# Patient Record
Sex: MALE | Race: AMERICAN INDIAN OR ALASKA NATIVE
[De-identification: names, ages, dates, MRNs, and addresses within clinical notes are randomized per-mention and may not be internally consistent; named-entity substitution may affect disease eponyms.]

---

## 2022-04-21 ENCOUNTER — HOSPITAL ENCOUNTER (EMERGENCY)
Dept: HOSPITAL 5 - ED | Age: 67
LOS: 1 days | Discharge: NURSING, REHAB OR LONG-TERM CARE | End: 2022-04-22
Payer: MEDICARE
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
## 2022-04-21 DIAGNOSIS — I10: ICD-10-CM

## 2022-04-21 DIAGNOSIS — S32.009A: Primary | ICD-10-CM
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
## 2022-04-21 DIAGNOSIS — Z87.891: ICD-10-CM
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
## 2022-04-21 PROCEDURE — 96372 THER/PROPH/DIAG INJ SC/IM: CPT

## 2022-04-21 PROCEDURE — 73521 X-RAY EXAM HIPS BI 2 VIEWS: CPT

## 2022-04-21 PROCEDURE — 72100 X-RAY EXAM L-S SPINE 2/3 VWS: CPT

## 2022-04-21 PROCEDURE — 99283 EMERGENCY DEPT VISIT LOW MDM: CPT

## 2022-04-21 PROCEDURE — 73562 X-RAY EXAM OF KNEE 3: CPT

## 2022-04-21 NOTE — XRAY REPORT
Lumbar spine 2 views



INDICATION: Low back pain following injury



IMPRESSION: there is loss of vertebral body height involving the L1 vertebral body concerning for mil
d compression fracture. Mild multilevel discogenic and facet arthropathy causing mild bilateral neura
l foraminal narrowing at L5-S1.



Signer Name: Pako Sher MD 

Signed: 4/21/2022 8:30 PM

Workstation Name: [email or web address]

## 2022-04-21 NOTE — XRAY REPORT
Bilateral hips 3 views



INDICATION: Lateral hip pain following injury



IMPRESSION: No fracture or subluxation is identified although the patient is moderately osteopenic. T
here is mild osteopenia. A large urinary bladder calculus projects within the right lower aspect of t
he urinary bladder.



Signer Name: Pako Sher MD 

Signed: 4/21/2022 8:29 PM

Workstation Name: [email or web address]

## 2022-04-21 NOTE — EMERGENCY DEPARTMENT REPORT
ED Fall HPI





- General


Chief Complaint: Fall


Stated Complaint: BACK PAIN/FALL X 2 DAYS


Time Seen by Provider: 04/21/22 19:21


Source: patient, EMS


Mode of arrival: Stretcher





- History of Present Illness


Initial Comments: 





Patient is 67-year-old male with history of COPD, hypertension, colon cancer.  

Patient brought to the emergency room via EMS from a local nursing home for 

evaluation after a fall that happened 3 days ago.  Patient tripped and fell 

backward.  Patient is complaining of lower back pain bilateral hip pain and 

bilateral knee pain.  Patient and nursing home denied any head injury.  No loss 

of consciousness.


MD Complaint: fall


-: days(s) (3)


Fall From: standing


When Fall Occurred: # days PTA (3)


Place Fall Occurred: nursing home/SNF


Loss of Consciousness: none


Prolonged Down Time?: no


Symptoms Prior to Fall: none


Location: back, pelvis


Location - Extremities: Left: Knee, Right: Knee


Quality: sharp


Context: tripped/slipped


Associated Symptoms: denies.  denies: headache, neck pain, numbness, weakness, 

chest paint, shortness of breath, abdominal pain, hematuria, unable to walk, 

lightheaded, vertigo, confusion





- Related Data


                                    Allergies











Allergy/AdvReac Type Severity Reaction Status Date / Time


 


No Known Allergies Allergy   Verified 04/21/22 18:58














ED Review of Systems


ROS: 


Stated complaint: BACK PAIN/FALL X 2 DAYS


Other details as noted in HPI





Comment: All other systems reviewed and negative


Constitutional: denies: chills, fever


Respiratory: denies: cough, shortness of breath, SOB with exertion, SOB at rest


Cardiovascular: denies: chest pain, palpitations


Gastrointestinal: denies: abdominal pain, nausea, vomiting, diarrhea, 

constipation, hematemesis, hematochezia


Musculoskeletal: back pain, arthralgia


Neurological: denies: headache, weakness





ED Past Medical Hx





- Past Medical History


Hx Hypertension: Yes


Hx COPD: Yes


Additional medical history: ETOH abuse.  hypomagnesemia





- Social History


Smoking Status: Former Smoker





ED Physical Exam





- General


Limitations: No Limitations


General appearance: alert, in no apparent distress





- Head


Head exam: Present: atraumatic, normocephalic, normal inspection





- Eye


Eye exam: Present: normal appearance





- ENT


ENT exam: Present: normal exam, normal orophraynx, mucous membranes moist





- Neck


Neck exam: Present: normal inspection, full ROM.  Absent: tenderness, 

meningismus





- Respiratory


Respiratory exam: Present: normal lung sounds bilaterally





- Cardiovascular


Cardiovascular Exam: Present: regular rate, normal rhythm, normal heart sounds





- GI/Abdominal


GI/Abdominal exam: Present: soft, normal bowel sounds.  Absent: distended, 

tenderness, guarding, rebound, rigid





- Back Exam


Back exam: Present: paraspinal tenderness





- Neurological Exam


Neurological exam: Present: alert, oriented X3





- Psychiatric


Psychiatric exam: Present: normal mood





ED Course


                                   Vital Signs











  04/21/22 04/21/22





  18:39 18:49


 


Temperature 98.3 F 97.0 F L


 


Pulse Rate 64 91 H


 


Respiratory 16 20





Rate  


 


Blood Pressure  143/76


 


Blood Pressure 126/76 





[Right]  


 


O2 Sat by Pulse 100 100





Oximetry  














ED Medical Decision Making





- Radiology Data


Radiology results: report reviewed





- Medical Decision Making





Patient is 67-year-old male with history of COPD, hypertension, colon cancer.  

Patient brought to the emergency room via EMS from a local nursing home for 

evaluation after a fall that happened 3 days ago.  Patient tripped and fell 

backward.  Patient is complaining of lower back pain bilateral hip pain and 

bilateral knee pain.  Patient and nursing home denied any head injury.  No loss 

of consciousness.





X-ray of bilateral hip and bilateral knee showed no acute fracture or 

dislocation.  X-ray of the lumbosacral spine showed L1 mild compression 

fracture.  Patient given prescription for pain medicine and advised to follow-up

 with orthopedics in the next 2 to 3 days and to return to the ER if develop any

 new symptoms.





Critical care attestation.: 


If time is entered above; I have spent that time in minutes in the direct care 

of this critically ill patient, excluding procedure time.








ED Disposition


Clinical Impression: 


 Fall, Vertebral fracture, closed





Disposition: [facility]


Is pt being admited?: No


Condition: Stable


Instructions:  Fall Prevention in the Home, Adult, Easy-to-Read, Lumbar Spine 

Fracture


Referrals: 


HUMA LEDEZMA MD [Primary Care Provider] - 3-5 Days


PETE DICKERSON MD [Staff Physician] - 3-5 Days

## 2022-04-21 NOTE — XRAY REPORT
Knees bilateral 3 views



INDICATION: Bilateral knee pain



IMPRESSION: No fracture or subluxation identified. Mild degenerative changes of both knees present.



Signer Name: Pako Sher MD 

Signed: 4/21/2022 8:29 PM

Workstation Name: [email or web address]

## 2022-04-22 VITALS — DIASTOLIC BLOOD PRESSURE: 67 MMHG | SYSTOLIC BLOOD PRESSURE: 137 MMHG

## 2022-05-13 ENCOUNTER — OUT OF OFFICE VISIT (OUTPATIENT)
Dept: URBAN - METROPOLITAN AREA MEDICAL CENTER 12 | Facility: MEDICAL CENTER | Age: 67
End: 2022-05-13
Payer: MEDICARE

## 2022-05-13 DIAGNOSIS — Z85.038 H/O COLON CANCER, STAGE I: ICD-10-CM

## 2022-05-13 DIAGNOSIS — Z79.01 ANTICOAGULANT LONG-TERM USE: ICD-10-CM

## 2022-05-13 DIAGNOSIS — K92.1 ACUTE MELENA: ICD-10-CM

## 2022-05-13 PROCEDURE — 99222 1ST HOSP IP/OBS MODERATE 55: CPT | Performed by: STUDENT IN AN ORGANIZED HEALTH CARE EDUCATION/TRAINING PROGRAM

## 2022-05-13 PROCEDURE — G8427 DOCREV CUR MEDS BY ELIG CLIN: HCPCS | Performed by: STUDENT IN AN ORGANIZED HEALTH CARE EDUCATION/TRAINING PROGRAM

## 2022-05-15 ENCOUNTER — OUT OF OFFICE VISIT (OUTPATIENT)
Dept: URBAN - METROPOLITAN AREA MEDICAL CENTER 12 | Facility: MEDICAL CENTER | Age: 67
End: 2022-05-15
Payer: MEDICARE

## 2022-05-15 DIAGNOSIS — K92.1 ACUTE MELENA: ICD-10-CM

## 2022-05-15 DIAGNOSIS — Z79.01 ANTICOAGULANT LONG-TERM USE: ICD-10-CM

## 2022-05-15 PROCEDURE — 99232 SBSQ HOSP IP/OBS MODERATE 35: CPT | Performed by: INTERNAL MEDICINE

## 2022-05-16 ENCOUNTER — OUT OF OFFICE VISIT (OUTPATIENT)
Dept: URBAN - METROPOLITAN AREA MEDICAL CENTER 12 | Facility: MEDICAL CENTER | Age: 67
End: 2022-05-16
Payer: MEDICARE

## 2022-05-16 DIAGNOSIS — K92.1 ACUTE MELENA: ICD-10-CM

## 2022-05-16 PROCEDURE — 43235 EGD DIAGNOSTIC BRUSH WASH: CPT | Performed by: INTERNAL MEDICINE
